# Patient Record
Sex: MALE | URBAN - METROPOLITAN AREA
[De-identification: names, ages, dates, MRNs, and addresses within clinical notes are randomized per-mention and may not be internally consistent; named-entity substitution may affect disease eponyms.]

---

## 2018-10-10 ENCOUNTER — OFFICE VISIT (OUTPATIENT)
Dept: URBAN - METROPOLITAN AREA CLINIC 88 | Facility: CLINIC | Age: 63
End: 2018-10-10
Payer: MEDICARE

## 2018-10-10 DIAGNOSIS — H57.10 OCULAR PAIN, UNSPECIFIED EYE: Primary | ICD-10-CM

## 2018-10-10 DIAGNOSIS — H25.813 COMBINED FORMS OF AGE-RELATED CATARACT, BILATERAL: ICD-10-CM

## 2018-10-10 DIAGNOSIS — H43.393 OTHER VITREOUS OPACITIES, BILATERAL: ICD-10-CM

## 2018-10-10 PROCEDURE — 99203 OFFICE O/P NEW LOW 30 MIN: CPT | Performed by: OPHTHALMOLOGY

## 2018-10-10 ASSESSMENT — KERATOMETRY
OD: 47.50
OS: 47.25

## 2018-10-10 ASSESSMENT — INTRAOCULAR PRESSURE
OD: 12
OS: 12

## 2018-10-10 NOTE — IMPRESSION/PLAN
Impression: Ocular pain, unspecified eye: H57.10. Plan: Discussed diagnosis in detail with patient. If eye pain continues to be bothersome.  I would recommend a MRI of Brain and Orbits w/wo contrast.